# Patient Record
Sex: MALE | Race: WHITE | NOT HISPANIC OR LATINO | Employment: FULL TIME | URBAN - METROPOLITAN AREA
[De-identification: names, ages, dates, MRNs, and addresses within clinical notes are randomized per-mention and may not be internally consistent; named-entity substitution may affect disease eponyms.]

---

## 2019-01-02 ENCOUNTER — HOSPITAL ENCOUNTER (OUTPATIENT)
Dept: URGENT CARE | Facility: CLINIC | Age: 49
Discharge: HOME | End: 2019-01-02
Attending: NURSE PRACTITIONER

## 2019-02-11 ENCOUNTER — OFFICE VISIT CONVERTED (OUTPATIENT)
Dept: INTERNAL MEDICINE | Facility: CLINIC | Age: 49
End: 2019-02-11
Attending: NURSE PRACTITIONER

## 2021-02-04 ENCOUNTER — OFFICE VISIT CONVERTED (OUTPATIENT)
Dept: INTERNAL MEDICINE | Facility: CLINIC | Age: 51
End: 2021-02-04
Attending: STUDENT IN AN ORGANIZED HEALTH CARE EDUCATION/TRAINING PROGRAM

## 2021-04-02 ENCOUNTER — HOSPITAL ENCOUNTER (OUTPATIENT)
Dept: URGENT CARE | Facility: CLINIC | Age: 51
Discharge: HOME | End: 2021-04-02
Attending: EMERGENCY MEDICINE

## 2021-05-14 VITALS
HEIGHT: 69 IN | WEIGHT: 266.25 LBS | HEART RATE: 102 BPM | SYSTOLIC BLOOD PRESSURE: 130 MMHG | OXYGEN SATURATION: 95 % | BODY MASS INDEX: 39.44 KG/M2 | TEMPERATURE: 97.3 F | DIASTOLIC BLOOD PRESSURE: 80 MMHG

## 2021-05-14 NOTE — PROGRESS NOTES
Progress Note      Patient Name: Roberto Espino   Patient ID: [id number]   Sex: Male   YOB: 1970    Primary Care Provider: Kandice GOODSON    Visit Date: February 4, 2021    Provider: Pari Blandon MD   Location: [facility]   Location Address: [address] [place] [address], [address]  [place], [place]  [id number]   Location Phone: [phone]          Chief Complaint  · Possible bug bite      History Of Present Illness  Roberto Espino is a 51 year old /White male who presents for evaluation and treatment of:      Patient presenting for rash and insect bite over left arm.   first noted yesterday, now with erythema and hardened area, both of which are expanding.  Endorses warmth over area.   Denies any specific trauma or injury to the involved area.  Shares that there were 2 pinpoint lesion over the left elbow a few days ago, pruritic in nature, which he had been picking at, which self resolved.  Denies fevers or chills.   Denies anyone at home with similar rash.   Denies hx of cellulitis.   He has a hx of sensitive skin.  Shares that his son has had staph infections, however this was several years ago.       Past Medical History  Disease Name Date Onset Notes   Anxiety --  --    Depression --  --    Migraine headache --  --    Night sweats --  --    Reflux Disease --  --          Medication List  Name Date Started Instructions   cyclobenzaprine 10 mg oral tablet  take 1 tablet (10 mg) by oral route 2 times per day   divalproex 500 mg oral tablet extended release 24 hr  take 3 tablets (1,500 mg) by oral route once every evening at bedtime   hydroxyzine HCl 50 mg oral tablet  take 1 tablet by oral route 2 times a day   Nexium 40 mg oral capsule,delayed release(DR/EC)  take 1 capsule (40 mg) by oral route once daily   propranolol 80 mg oral capsule,extended release 24 hr  take 1 capsule (80 mg) by oral route once daily   Prozac 20 mg oral capsule  take 1 capsule (20 mg) by oral  "route 2 times per day in the morning and at noon   quetiapine 50 mg oral tablet  take 0.5 tablet by oral route in the morning and afternoon and then 2 tablets every evening before bed.   Requip 3 mg oral tablet  take 1 tablet (3 mg) by oral route 1-3 hours before bedtime   sumatriptan succinate 50 mg oral tablet  take 1 tablet (50 mg) by oral route after onset of migraine; may repeat after 2 hours if headache returns, not to exceed 200mg in 24hrs         Allergy List  Allergen Name Date Reaction Notes   NO KNOWN DRUG ALLERGIES --  --  --        Allergies Reconciled  Family Medical History  Disease Name Relative/Age Notes   Stroke  --    Diabetes  --          Social History  Finding Status Start/Stop Quantity Notes   Tobacco Never --/-- --  --          Review of Systems  · Constitutional  o Denies  o : fever, fatigue, weight loss, weight gain  · Cardiovascular  o Denies  o : lower extremity edema, claudication, chest pressure, palpitations  · Respiratory  o Denies  o : shortness of breath, wheezing, frequent cough, hemoptysis, dyspnea on exertion  · Gastrointestinal  o Denies  o : nausea, vomiting, diarrhea, constipation, abdominal pain  · Integument  o Admits  o : rash, itching, skin dryness      Vitals  Date Time BP Position Site L\R Cuff Size HR RR TEMP (F) WT  HT  BMI kg/m2 BSA m2 O2 Sat FR L/min FiO2 HC       02/11/2019 03:58 /82 Sitting    90 - R 16 98.2 255lbs 2oz 5'  9\" 37.67 2.37 96 %      02/04/2021 01:37 /80 Sitting    102 - R  97.3 266lbs 4oz 5'  9\" 39.32 2.42 95 %  21%          Physical Examination  · Constitutional  o Appearance  o : no acute distress, well-nourished  · Head and Face  o Head  o :   § Inspection  § : atraumatic, normocephalic  · Respiratory  o Respiratory Effort  o : breathing comfortably, symmetric chest rise  · Skin and Subcutaneous Tissue  o General Inspection  o : Indurated are over medial aspect of the Elbow measuring approximately 3 cm in diameter with darker center. " There is surrounding erythema extending 8 cm approximately towards the axilla and ~6 cm distally towards the forearm. Involved area is warm to the touch. Area is non-tender. 2 smaller pinpoint lesion noted over elbow, well healed.   · Neurologic  o Mental Status Examination  o :   § Orientation  § : grossly oriented to person, place and time  o Gait and Station  o :   § Gait Screening  § : normal gait              Assessment  · Cellulitis of left upper extremity     682.3/L03.114  Acute onset of left upper extremity cellulitis with exam findings as documented above. Keflex, 7-day course, sent in. Patient advised to call if symptoms fail to improve after 48 to 72 hours of this antibiotic. f/u in 1 week.     Problems Reconciled  Plan  · Orders  o ACO-39: Current medications updated and reviewed (1159F, ) - 682.3/L03.114 - 02/04/2021  · Medications  o cephalexin 500 mg oral capsule   SIG: take 1 capsule (500 mg) by oral route every 6 hours for 7 days   DISP: (28) Capsule with 0 refills  Prescribed on 02/04/2021     o Medications have been Reconciled  o Transition of Care or Provider Policy  · Instructions  o Take all medications as prescribed/directed.  o Patient was educated/instructed on their diagnosis, treatment and medications prior to discharge from the clinic today.  o Call the office with any concerns or questions.  · Disposition  o Follow up 1 week.            Electronically Signed by: Pari Blandon MD -Author on February 4, 2021 02:34:40 PM

## 2021-05-15 VITALS
BODY MASS INDEX: 37.79 KG/M2 | DIASTOLIC BLOOD PRESSURE: 82 MMHG | WEIGHT: 255.12 LBS | HEIGHT: 69 IN | SYSTOLIC BLOOD PRESSURE: 116 MMHG | TEMPERATURE: 98.2 F | RESPIRATION RATE: 16 BRPM | OXYGEN SATURATION: 96 % | HEART RATE: 90 BPM

## 2022-01-03 ENCOUNTER — LAB (OUTPATIENT)
Dept: LAB | Facility: HOSPITAL | Age: 52
End: 2022-01-03

## 2022-01-03 ENCOUNTER — TELEPHONE (OUTPATIENT)
Dept: INTERNAL MEDICINE | Facility: CLINIC | Age: 52
End: 2022-01-03

## 2022-01-03 DIAGNOSIS — Z20.822 EXPOSURE TO CONFIRMED CASE OF COVID-19: Primary | ICD-10-CM

## 2022-01-03 DIAGNOSIS — Z20.822 EXPOSURE TO CONFIRMED CASE OF COVID-19: ICD-10-CM

## 2022-01-03 PROCEDURE — U0004 COV-19 TEST NON-CDC HGH THRU: HCPCS

## 2022-01-03 NOTE — TELEPHONE ENCOUNTER
Red rule verified and correct.    Pt calling stating his son just tested positive and his work is wanting him tested.    Both were exposed at the same time.    Order placed for [place] [id number] through

## 2022-01-04 LAB — SARS-COV-2 RNA PNL SPEC NAA+PROBE: NOT DETECTED

## 2022-01-11 ENCOUNTER — TELEMEDICINE (OUTPATIENT)
Dept: FAMILY MEDICINE CLINIC | Facility: TELEHEALTH | Age: 52
End: 2022-01-11

## 2022-01-11 VITALS — WEIGHT: 266 LBS | BODY MASS INDEX: 39.4 KG/M2 | HEIGHT: 69 IN

## 2022-01-11 DIAGNOSIS — B86 SCABIES: Primary | ICD-10-CM

## 2022-01-11 PROBLEM — F32.A DEPRESSION: Status: ACTIVE | Noted: 2022-01-11

## 2022-01-11 PROBLEM — G43.909 MIGRAINE HEADACHE: Status: ACTIVE | Noted: 2022-01-11

## 2022-01-11 PROBLEM — R61 NIGHT SWEATS: Status: ACTIVE | Noted: 2022-01-11

## 2022-01-11 PROBLEM — F41.9 ANXIETY: Status: ACTIVE | Noted: 2022-01-11

## 2022-01-11 PROBLEM — K21.9 ESOPHAGEAL REFLUX: Status: ACTIVE | Noted: 2022-01-11

## 2022-01-11 PROCEDURE — 99203 OFFICE O/P NEW LOW 30 MIN: CPT | Performed by: NURSE PRACTITIONER

## 2022-01-11 RX ORDER — SUMATRIPTAN 50 MG/1
TABLET, FILM COATED ORAL
COMMUNITY

## 2022-01-11 RX ORDER — FLUOXETINE HYDROCHLORIDE 20 MG/1
CAPSULE ORAL
COMMUNITY

## 2022-01-11 RX ORDER — ROPINIROLE 3 MG/1
TABLET, FILM COATED ORAL
COMMUNITY

## 2022-01-11 RX ORDER — HYDROXYZINE 50 MG/1
TABLET, FILM COATED ORAL
COMMUNITY

## 2022-01-11 RX ORDER — PERMETHRIN 50 MG/G
1 CREAM TOPICAL ONCE
Qty: 1 G | Refills: 0 | Status: SHIPPED | OUTPATIENT
Start: 2022-01-11 | End: 2022-01-11

## 2022-01-11 NOTE — PROGRESS NOTES
"You have chosen to receive care through a telehealth visit.  Do you consent to use a video/audio connection for your medical care today? Yes     CHIEF COMPLAINT  Cc: rash    HPI  Roberto Espino is a 51 y.o. male  presents with complaint of a rash on his bilateral upper arms. It is itchy, red with some scabs. It is up and down both arms. He reports that he was treated once at his PCP office where he developed cellulitis from scratching some of the spots. He thought it might be scabies but no one in his household has the spots. He has also check his bed for bedbugs and there were none.     Review of Systems   Constitutional: Negative for fatigue and fever.   HENT: Negative for congestion and sore throat.    Respiratory: Negative for cough, chest tightness, shortness of breath and wheezing.    Cardiovascular: Negative for chest pain.   Gastrointestinal: Negative for diarrhea, nausea and vomiting.   Musculoskeletal: Negative for myalgias.   Skin: Positive for rash (bilateral arms, red, itchy spots with some scabs up and down both arms).   Neurological: Positive for headaches.       Past Medical History:   Diagnosis Date   • GERD (gastroesophageal reflux disease)        No family history on file.    Social History     Socioeconomic History   • Marital status:    Tobacco Use   • Smoking status: Never Smoker         Ht 175.3 cm (69\")   Wt 121 kg (266 lb)   BMI 39.28 kg/m²     PHYSICAL EXAM  Physical Exam   Constitutional: He is oriented to person, place, and time. He appears well-developed and well-nourished.   HENT:   Head: Normocephalic and atraumatic.   Right Ear: External ear normal.   Left Ear: External ear normal.   Nose: Nose normal.   Mouth/Throat: Mouth/Lips are normal.  Eyes: Lids are normal. Right eye exhibits no discharge and no exudate. Left eye exhibits no discharge and no exudate. Right conjunctiva is not injected. Left conjunctiva is not injected.   Pulmonary/Chest: No accessory muscle usage. No " tachypnea and no bradypnea.  No respiratory distress.No use of oxygen by nasal cannulaNo use of oxygen by mask noted.  Abdominal: Abdomen appears normal.   Neurological: He is alert and oriented to person, place, and time. No cranial nerve deficit.   Skin: Lesion: bilateral arms lesions in various stages, some erythemtous some crusted most in linear pattern.   Psychiatric: He has a normal mood and affect. His speech is normal and behavior is normal. Judgment and thought content normal.       Results for orders placed or performed in visit on 01/03/22   COVID-19,APTIMA PANTHER(KIKE),[facility], NP/OP SWAB IN UTM/VTM/[facility] MEDIA,24 HR TAT - Swab, Nasopharynx    Specimen: Nasopharynx; Swab   Result Value Ref Range    COVID19 Not Detected Not Detected - Ref. Range       Diagnoses and all orders for this visit:    1. Scabies (Primary)    Other orders  -     permethrin (ELIMITE) 5 % cream; Apply 1 application topically to the appropriate area as directed 1 (One) Time for 1 dose. May repeat in one week if need  Dispense: 1 g; Refill: 0    May take benadryl 25 mg every 4 to 6 hours as needed for itching   Alternative cetirizine 10 mg in the morning and benadryl at night  Try not to scratch  the affected areas of your skin  hot water to wash items. Dry items on the hot dry cycle  May spray furniture with Rid spray     FOLLOW-UP  If symptoms worsen or persist follow up with PCP,Virtual Care, dermatologist or Urgent Care    Patient verbalizes understanding of medication dosage, comfort measures, instructions for treatment and follow-up.    HAMZAH Eastman  01/11/2022  12:40 EST    This visit was performed via Telehealth.  This patient has been instructed to follow-up with their primary care provider if their symptoms worsen or the treatment provided does not resolve their illness.

## 2022-01-11 NOTE — PATIENT INSTRUCTIONS
Scabies, Adult    Scabies is a skin condition that happens when very small insects called mites get under the skin (infestation). This causes a rash and severe itchiness. Scabies is contagious, which means it can spread from person to person. If you get scabies, it is common for others in your household to get scabies too.  With proper treatment, symptoms usually go away in 2-4 weeks. Scabies usually does not cause lasting problems.  What are the causes?  This condition is caused by tiny mites (Sarcoptes scabiei, or human itch mites) that can only be seen with a microscope. The mites get into the top layer of skin and lay eggs. Scabies can spread from person to person through:  · Close contact with a person who has scabies.  · Sharing or having contact with infested items, such as towels, bedding, or clothing.  What increases the risk?  The following factors may make you more likely to develop this condition:  · Living in a nursing home or other extended care facility.  · Having sexual contact with a partner who has scabies.  · Caring for others who are at increased risk for scabies.  What are the signs or symptoms?  Symptoms of this condition include:  · Severe itchiness. This is often worse at night.  · A rash that includes tiny red bumps or blisters. The rash commonly occurs on the hands, wrists, elbows, armpits, chest, waist, groin, or buttocks. The bumps may form a line (burrow) in some areas.  · Skin irritation. This can include scaly patches or sores.  How is this diagnosed?  This condition may be diagnosed based on:  · A physical exam of the skin.  · A skin test. Your health care provider may take a sample of your affected skin (skin scraping) and have it examined under a microscope for signs of mites.  How is this treated?  This condition may be treated with:  · Medicated cream or lotion that kills the mites. This is spread on the entire body and left on for several hours. Usually, one treatment with  medicated cream or lotion is enough to kill all the mites. In severe cases, the treatment may need to be repeated.  · Medicated cream that relieves itching.  · Medicines taken by mouth (orally) that:  ? Relieve itching.  ? Reduce the swelling and redness.  ? Kill the mites. This treatment may be done in severe cases.  Follow these instructions at home:  Medicines  · Take or apply over-the-counter and prescription medicines only as told by your health care provider.  · Apply medicated cream or lotion as told by your health care provider.  · Do not wash off the medicated cream or lotion until the necessary amount of time has passed.  Skin care  · Avoid scratching the affected areas of your skin.  · Keep your fingernails closely trimmed to reduce injury from scratching.  · Take cool baths or apply cool washcloths to your skin to help reduce itching.  General instructions  · Clean all items that you had contact with during the 3 days before diagnosis. This includes bedding, clothing, towels, and furniture. Do this on the same day that you start treatment.  ? Dry-clean items, or use hot water to wash items. Dry items on the hot dry cycle.  ? Place items that cannot be washed into closed, airtight plastic bags for at least 3 days. The mites cannot live for more than 3 days away from human skin.  ? Vacuum furniture and mattresses that you use.  · Make sure that other people who may have been infested are examined by a health care provider. These include members of your household and anyone who may have had contact with infested items.  · Keep all follow-up visits. This is important.  Where to find more information  · Centers for Disease Control and Prevention: www.cdc.gov  Contact a health care provider if:  · You have itching that does not go away after 4 weeks of treatment.  · You continue to develop new bumps or burrows.  · You have redness, swelling, or pain in your rash area after treatment.  · You have fluid, blood,  or pus coming from your rash.  Summary  · Scabies is a skin condition that causes a rash and severe itchiness.  · This condition is caused by tiny mites that get into the top layer of the skin and lay eggs.  · Scabies can spread from person to person.  · Follow treatments as recommended by your health care provider.  · Clean all items that you recently had contact with.  This information is not intended to replace advice given to you by your health care provider. Make sure you discuss any questions you have with your health care provider.  Document Revised: 04/16/2021 Document Reviewed: 04/16/2021  Elsevier Patient Education © 2021 [employer]

## 2022-01-13 ENCOUNTER — TELEPHONE (OUTPATIENT)
Dept: INTERNAL MEDICINE | Facility: CLINIC | Age: 52
End: 2022-01-13

## 2022-05-03 ENCOUNTER — OFFICE VISIT (OUTPATIENT)
Dept: INTERNAL MEDICINE | Facility: CLINIC | Age: 52
End: 2022-05-03

## 2022-05-03 VITALS
HEIGHT: 69 IN | HEART RATE: 102 BPM | TEMPERATURE: 98.4 F | BODY MASS INDEX: 37.47 KG/M2 | SYSTOLIC BLOOD PRESSURE: 118 MMHG | DIASTOLIC BLOOD PRESSURE: 78 MMHG | WEIGHT: 253 LBS | OXYGEN SATURATION: 97 %

## 2022-05-03 DIAGNOSIS — J02.9 ACUTE PHARYNGITIS, UNSPECIFIED ETIOLOGY: Primary | ICD-10-CM

## 2022-05-03 DIAGNOSIS — H92.01 RIGHT EAR PAIN: ICD-10-CM

## 2022-05-03 LAB
EXPIRATION DATE: NORMAL
INTERNAL CONTROL: NORMAL
Lab: NORMAL
S PYO AG THROAT QL: NEGATIVE

## 2022-05-03 PROCEDURE — 87081 CULTURE SCREEN ONLY: CPT | Performed by: PHYSICIAN ASSISTANT

## 2022-05-03 PROCEDURE — 87880 STREP A ASSAY W/OPTIC: CPT | Performed by: PHYSICIAN ASSISTANT

## 2022-05-03 PROCEDURE — 99213 OFFICE O/P EST LOW 20 MIN: CPT | Performed by: PHYSICIAN ASSISTANT

## 2022-05-03 RX ORDER — CETIRIZINE HYDROCHLORIDE 10 MG/1
10 TABLET ORAL DAILY
Qty: 30 TABLET | Refills: 2 | Status: SHIPPED | OUTPATIENT
Start: 2022-05-03 | End: 2023-03-05

## 2022-05-03 RX ORDER — FLUTICASONE PROPIONATE 50 MCG
2 SPRAY, SUSPENSION (ML) NASAL DAILY
Qty: 11.1 ML | Refills: 2 | Status: SHIPPED | OUTPATIENT
Start: 2022-05-03

## 2022-05-03 NOTE — PROGRESS NOTES
"Chief Complaint  Sore Throat (Was treated 4/13 for positive strep)    Subjective          Roberto Espino presents to [facility]  Sore throat- was treated for strep 3 weeks ago.  He was put on amoxicillin and states that his symptoms resolved for about a week but a couple days ago he started having soreness under his tongue on the right side that radiates upward into his ear.        Objective   Vital Signs:   /78 (BP Location: Right arm, Patient Position: Sitting, Cuff Size: Large Adult)   Pulse 102   Temp 98.4 °F (36.9 °C) (Temporal)   Ht 175.3 cm (69\")   Wt 115 kg (253 lb)   SpO2 97%   BMI 37.36 kg/m²     Physical Exam  Vitals reviewed.   Constitutional:       Appearance: Normal appearance. He is well-developed.   HENT:      Head: Normocephalic and atraumatic.      Right Ear: Tympanic membrane, ear canal and external ear normal.      Left Ear: Tympanic membrane, ear canal and external ear normal.      Mouth/Throat:      Pharynx: No oropharyngeal exudate.   Eyes:      Conjunctiva/sclera: Conjunctivae normal.      Pupils: Pupils are equal, round, and reactive to light.   Cardiovascular:      Rate and Rhythm: Normal rate and regular rhythm.      Heart sounds: No murmur heard.    No friction rub. No gallop.   Pulmonary:      Effort: Pulmonary effort is normal.      Breath sounds: Normal breath sounds. No wheezing or rhonchi.   Abdominal:      General: Bowel sounds are normal. There is no distension.      Palpations: Abdomen is soft.      Tenderness: There is no abdominal tenderness.   Skin:     General: Skin is warm and dry.   Neurological:      Mental Status: He is alert and oriented to person, place, and time.      Cranial Nerves: No cranial nerve deficit.   Psychiatric:         Mood and Affect: Mood and affect normal.         Behavior: Behavior normal.         Thought Content: Thought content normal.         Judgment: Judgment normal.        Result " Review :          Procedures      Assessment and Plan    Diagnoses and all orders for this visit:    1. Acute pharyngitis, unspecified etiology (Primary)  Assessment & Plan:  Lab Results   Component Value Date    RAPSCRN Negative 05/03/2022     Will send strep swab for culture.  Will treat as postnasal drainage secondary to allergies with Flonase and Zyrtec.  Due to recurrence of throat and ear pain will have patient follow-up after trying allergy treatment.  If symptoms have not improved or resolved could consider further work-up including blood work and imaging such as possibly head and neck CT.  If patient develops new or worsening symptoms he is to return sooner.    Orders:  -     POCT rapid strep A  -     Beta Strep Culture, Throat - Swab, Throat    2. Right ear pain  Assessment & Plan:  Will send in Flonase and Zyrtec to use as needed.    Orders:  -     fluticasone (Flonase) 50 MCG/ACT nasal spray; 2 sprays into the nostril(s) as directed by provider Daily.  Dispense: 11.1 mL; Refill: 2  -     cetirizine (zyrTEC) 10 MG tablet; Take 1 tablet by mouth Daily for 30 days.  Dispense: 30 tablet; Refill: 2            Follow Up   No follow-ups on file.  Patient was given instructions and counseling regarding his condition or for health maintenance advice. Please see specific information pulled into the AVS if appropriate.

## 2022-05-03 NOTE — ASSESSMENT & PLAN NOTE
Lab Results   Component Value Date    RAPSCRN Negative 05/03/2022     Will send strep swab for culture.  Will treat as postnasal drainage secondary to allergies with Flonase and Zyrtec.  Due to recurrence of throat and ear pain will have patient follow-up after trying allergy treatment.  If symptoms have not improved or resolved could consider further work-up including blood work and imaging such as possibly head and neck CT.  If patient develops new or worsening symptoms he is to return sooner.

## 2022-05-05 LAB — BACTERIA SPEC AEROBE CULT: NORMAL

## 2022-05-10 ENCOUNTER — OFFICE VISIT (OUTPATIENT)
Dept: INTERNAL MEDICINE | Facility: CLINIC | Age: 52
End: 2022-05-10

## 2022-05-10 VITALS
SYSTOLIC BLOOD PRESSURE: 118 MMHG | HEIGHT: 69 IN | TEMPERATURE: 98 F | WEIGHT: 260 LBS | BODY MASS INDEX: 38.51 KG/M2 | OXYGEN SATURATION: 98 % | DIASTOLIC BLOOD PRESSURE: 65 MMHG | HEART RATE: 103 BPM

## 2022-05-10 DIAGNOSIS — J02.9 ACUTE PHARYNGITIS, UNSPECIFIED ETIOLOGY: Primary | ICD-10-CM

## 2022-05-10 PROCEDURE — 99213 OFFICE O/P EST LOW 20 MIN: CPT | Performed by: NURSE PRACTITIONER

## 2022-05-10 NOTE — ASSESSMENT & PLAN NOTE
Improving, however tonsil size impressive on exam today. Discussed options for management, including close monitoring versus ENT referral versus neck CT scan due to duration of symptoms. Since symptoms continue to improve patient would prefer close follow up in one month to reevaluate tonsil size and overall discomfort. He will call or return to clinic if pain becomes more severe, if he has difficulty swallowing or if he develops fever or shortness of breath. Low threshold for CT scan in this patient. Patient voices understanding and is agreeable to plan.

## 2022-05-10 NOTE — PROGRESS NOTES
"Chief Complaint  Follow-up and Sore Throat    Subjective          Roberto Espino presents to [facility]  Patient in clinic for one week follow up on sore throat. Initially treated with antibiotics for strep, however sore throat returned after being off of antibiotics x 1 week. Patient states pain started under his tongue and radiated up into his neck towards his ear. States the pain in his throat was so severe it hurt to swallow and he could not make the \"R\" sound. Patient was evaluated in clinic last week and started on Zyrtec and Flonase. Patient states he took the medications but did not feel like they were helping. States he did some research and felt like this was a lymph node so he started treating with warm compresses. Two days ago he went to bed with sore throat and had sweating that night. States he feels like he had a fever that broke. The next morning he woke up feeling better, the pain in the right side had gone down. He has noticed some tenderness on his left side but nothing as severe as his recent issues. Has been able to tolerate swallowing foods over the past two days.       Objective   Vital Signs:  /65   Pulse 103   Temp 98 °F (36.7 °C)   Ht 175.3 cm (69\")   Wt 118 kg (260 lb)   SpO2 98%   BMI 38.40 kg/m²           Physical Exam  Constitutional:       Appearance: Normal appearance.   HENT:      Head: Normocephalic and atraumatic.      Nose: Nose normal.      Mouth/Throat:      Mouth: Mucous membranes are moist.      Pharynx: Oropharynx is clear. Posterior oropharyngeal erythema present.      Comments: Tonsils 4+ and erythematous  Eyes:      Extraocular Movements: Extraocular movements intact.      Conjunctiva/sclera: Conjunctivae normal.      Pupils: Pupils are equal, round, and reactive to light.   Cardiovascular:      Rate and Rhythm: Normal rate and regular rhythm.      Heart sounds: Normal heart sounds.   Pulmonary:      Effort: " Pulmonary effort is normal.      Breath sounds: Normal breath sounds.   Skin:     General: Skin is warm and dry.   Neurological:      General: No focal deficit present.      Mental Status: He is alert and oriented to person, place, and time.   Psychiatric:         Mood and Affect: Mood normal.         Behavior: Behavior normal.         Thought Content: Thought content normal.        Result Review :                 Assessment and Plan    Diagnoses and all orders for this visit:    1. Acute pharyngitis, unspecified etiology (Primary)  Assessment & Plan:  Improving, however tonsil size impressive on exam today. Discussed options for management, including close monitoring versus ENT referral versus neck CT scan due to duration of symptoms. Since symptoms continue to improve patient would prefer close follow up in one month to reevaluate tonsil size and overall discomfort. He will call or return to clinic if pain becomes more severe, if he has difficulty swallowing or if he develops fever or shortness of breath. Low threshold for CT scan in this patient. Patient voices understanding and is agreeable to plan.              Follow Up   Return in about 1 month (around 6/10/2022).  Patient was given instructions and counseling regarding his condition or for health maintenance advice. Please see specific information pulled into the AVS if appropriate.

## 2022-10-11 ENCOUNTER — OFFICE VISIT (OUTPATIENT)
Dept: INTERNAL MEDICINE | Facility: CLINIC | Age: 52
End: 2022-10-11

## 2022-10-11 VITALS
SYSTOLIC BLOOD PRESSURE: 134 MMHG | HEIGHT: 69 IN | OXYGEN SATURATION: 97 % | DIASTOLIC BLOOD PRESSURE: 76 MMHG | TEMPERATURE: 98 F | BODY MASS INDEX: 40.32 KG/M2 | WEIGHT: 272.25 LBS | HEART RATE: 96 BPM

## 2022-10-11 DIAGNOSIS — J10.1 INFLUENZA A: Primary | ICD-10-CM

## 2022-10-11 DIAGNOSIS — J02.9 SORE THROAT: ICD-10-CM

## 2022-10-11 LAB
EXPIRATION DATE: ABNORMAL
EXPIRATION DATE: NORMAL
FLUAV AG UPPER RESP QL IA.RAPID: DETECTED
FLUBV AG UPPER RESP QL IA.RAPID: NOT DETECTED
INTERNAL CONTROL: ABNORMAL
INTERNAL CONTROL: NORMAL
Lab: ABNORMAL
Lab: NORMAL
S PYO AG THROAT QL: NEGATIVE
SARS-COV-2 AG UPPER RESP QL IA.RAPID: NOT DETECTED

## 2022-10-11 PROCEDURE — 99213 OFFICE O/P EST LOW 20 MIN: CPT | Performed by: NURSE PRACTITIONER

## 2022-10-11 PROCEDURE — 87428 SARSCOV & INF VIR A&B AG IA: CPT | Performed by: NURSE PRACTITIONER

## 2022-10-11 PROCEDURE — 87880 STREP A ASSAY W/OPTIC: CPT | Performed by: NURSE PRACTITIONER

## 2022-10-11 RX ORDER — DEXTROMETHORPHAN HYDROBROMIDE AND PROMETHAZINE HYDROCHLORIDE 15; 6.25 MG/5ML; MG/5ML
5 SYRUP ORAL 4 TIMES DAILY PRN
Qty: 118 ML | Refills: 0 | Status: SHIPPED | OUTPATIENT
Start: 2022-10-11

## 2022-10-11 RX ORDER — BENZONATATE 100 MG/1
100 CAPSULE ORAL 3 TIMES DAILY PRN
Qty: 30 CAPSULE | Refills: 0 | Status: SHIPPED | OUTPATIENT
Start: 2022-10-11

## 2022-10-11 NOTE — ASSESSMENT & PLAN NOTE
Exam reassuring, lung sounds clear, O2 sat 7%. Influenza positive, COVID-19 and rapid strep negative. Discussed course of viral illness.  Patient aware of importance of masking, avoiding exposure to other sick or high risk contacts, cough etiquette, hand hygiene, disinfecting surfaces, and avoiding touching eyes, nose and mouth. Continue supportive care. Increase fluids, monitor output. Tylenol/Motrin for fever/comfort.  Promethazine DM to pharmacy for nighttime, Tessalon Perles to be used during the day.  Salt water gargles, warm tea with honey, throat lozenges for sore throat management.  He will seek medical attention immediately with persistent fever, cough, shortness of breath. Patient to call or return to clinic if symptoms worsen or persist.  Work note provided.

## 2022-10-11 NOTE — PROGRESS NOTES
"Chief Complaint  Cough (Cough and abdominal pain from coughing hurts under left ribs, sore throat )    Subjective         Roberto Espino presents to [facility]  Patient reports cough, sore throat, congestion, runny nose, body aches, fatigue x 6 days.  Symptoms have improved slightly today.  Subjective fever initially. Denies shortness of breath, vomiting, diarrhea.  Eating/drinking well.  Plenty of urine output. He has been treating with Mucinex, Flonase, OTC cough.  States that the cough is so severe that it is keeping him up at night, is also having rib pain with cough.  Patient states his wife just tested positive for influenza today and his kids had it last week.  Denies known COVID-19 exposures.         Objective     Vitals:    10/11/22 1418   BP: 134/76   BP Location: Left arm   Patient Position: Sitting   Cuff Size: Adult   Pulse: 96   Temp: 98 °F (36.7 °C)   TempSrc: Infrared   SpO2: 97%   Weight: 123 kg (272 lb 4 oz)   Height: 175.3 cm (69\")      Body mass index is 40.2 kg/m².    Wt Readings from Last 3 Encounters:   10/11/22 123 kg (272 lb 4 oz)   06/24/22 113 kg (250 lb)   05/10/22 118 kg (260 lb)     BP Readings from Last 3 Encounters:   10/11/22 134/76   06/24/22 133/92   05/10/22 118/65                Physical Exam  Constitutional:       Appearance: Normal appearance.   HENT:      Head: Normocephalic and atraumatic.      Right Ear: Tympanic membrane normal.      Left Ear: Tympanic membrane normal.      Nose: Nose normal.      Mouth/Throat:      Mouth: Mucous membranes are moist.      Pharynx: Oropharynx is clear.   Eyes:      Extraocular Movements: Extraocular movements intact.      Conjunctiva/sclera: Conjunctivae normal.      Pupils: Pupils are equal, round, and reactive to light.   Cardiovascular:      Rate and Rhythm: Normal rate and regular rhythm.      Heart sounds: Normal heart sounds.   Pulmonary:      Effort: Pulmonary effort is normal.      " Breath sounds: Normal breath sounds.   Abdominal:      General: Bowel sounds are normal.      Palpations: Abdomen is soft.   Skin:     General: Skin is warm and dry.   Neurological:      General: No focal deficit present.      Mental Status: He is alert and oriented to person, place, and time.   Psychiatric:         Mood and Affect: Mood normal.         Behavior: Behavior normal.         Thought Content: Thought content normal.          Result Review :   The following data was reviewed by: HAMZAH Velazquez on 10/11/2022:      Procedures    Assessment and Plan   Diagnoses and all orders for this visit:    1. Influenza A (Primary)  Assessment & Plan:  Exam reassuring, lung sounds clear, O2 sat 7%. Influenza positive, COVID-19 and rapid strep negative. Discussed course of viral illness.  Patient aware of importance of masking, avoiding exposure to other sick or high risk contacts, cough etiquette, hand hygiene, disinfecting surfaces, and avoiding touching eyes, nose and mouth. Continue supportive care. Increase fluids, monitor output. Tylenol/Motrin for fever/comfort.  Promethazine DM to pharmacy for nighttime, Tessalon Perles to be used during the day.  Salt water gargles, warm tea with honey, throat lozenges for sore throat management.  He will seek medical attention immediately with persistent fever, cough, shortness of breath. Patient to call or return to clinic if symptoms worsen or persist.  Work note provided.        2. Sore throat  -     POCT SARS-CoV-2 Antigen AVELINO + Flu  -     POCT rapid strep A    Other orders  -     promethazine-dextromethorphan (PROMETHAZINE-DM) 6.25-15 MG/5ML syrup; Take 5 mL by mouth 4 (Four) Times a Day As Needed for Cough.  Dispense: 118 mL; Refill: 0  -     benzonatate (Tessalon Perles) 100 MG capsule; Take 1 capsule by mouth 3 (Three) Times a Day As Needed for Cough.  Dispense: 30 capsule; Refill: 0        Follow Up   Return if symptoms worsen or fail to improve.  Patient was given  instructions and counseling regarding his condition or for health maintenance advice. Please see specific information pulled into the AVS if appropriate.

## 2023-03-05 PROCEDURE — U0004 COV-19 TEST NON-CDC HGH THRU: HCPCS | Performed by: FAMILY MEDICINE

## 2023-10-16 ENCOUNTER — OFFICE VISIT (OUTPATIENT)
Dept: INTERNAL MEDICINE | Facility: CLINIC | Age: 53
End: 2023-10-16
Payer: OTHER GOVERNMENT

## 2023-10-16 VITALS
SYSTOLIC BLOOD PRESSURE: 128 MMHG | TEMPERATURE: 98.3 F | BODY MASS INDEX: 42.65 KG/M2 | DIASTOLIC BLOOD PRESSURE: 74 MMHG | HEIGHT: 69 IN | WEIGHT: 288 LBS | OXYGEN SATURATION: 95 % | HEART RATE: 96 BPM

## 2023-10-16 DIAGNOSIS — R05.9 COUGH, UNSPECIFIED TYPE: Primary | ICD-10-CM

## 2023-10-16 DIAGNOSIS — R50.9 FEVER, UNSPECIFIED FEVER CAUSE: ICD-10-CM

## 2023-10-16 LAB
EXPIRATION DATE: NORMAL
EXPIRATION DATE: NORMAL
FLUAV AG UPPER RESP QL IA.RAPID: NOT DETECTED
FLUBV AG UPPER RESP QL IA.RAPID: NOT DETECTED
INTERNAL CONTROL: NORMAL
INTERNAL CONTROL: NORMAL
Lab: 6887
Lab: 8208
S PYO AG THROAT QL: NEGATIVE
SARS-COV-2 AG UPPER RESP QL IA.RAPID: NOT DETECTED

## 2023-10-16 PROCEDURE — 99213 OFFICE O/P EST LOW 20 MIN: CPT | Performed by: PHYSICIAN ASSISTANT

## 2023-10-16 PROCEDURE — 87880 STREP A ASSAY W/OPTIC: CPT | Performed by: PHYSICIAN ASSISTANT

## 2023-10-16 PROCEDURE — 87428 SARSCOV & INF VIR A&B AG IA: CPT | Performed by: PHYSICIAN ASSISTANT

## 2023-10-16 RX ORDER — BROMPHENIRAMINE MALEATE, PSEUDOEPHEDRINE HYDROCHLORIDE, AND DEXTROMETHORPHAN HYDROBROMIDE 2; 30; 10 MG/5ML; MG/5ML; MG/5ML
10 SYRUP ORAL 4 TIMES DAILY PRN
Qty: 473 ML | Refills: 0 | Status: SHIPPED | OUTPATIENT
Start: 2023-10-16

## 2023-10-16 NOTE — ASSESSMENT & PLAN NOTE
Negative flu, covid and strep in office. Likely viral etiology.  Would expect symptoms to be self limiting within the next few days.  Continue conservative treatment at this time, will send in Bromfed.  Watch closely for new or worsening symptoms, especially if patient develops fevers, difficulty breathing or signs of dehydration.  Call or return if symptoms persist or worsen.

## 2023-10-16 NOTE — PROGRESS NOTES
"Chief Complaint  Fever (Symptoms started Saturday.), Chills, Headache, and Anorexia    Subjective          Roberto Espino presents to [facility]    Fever- symptoms started initially about 2 days ago with fever, nasal congestion, headache, loss of appetite.  He has been taking dayquil/nyquil and zyrtec.  No sick contacts that he is aware of.    Objective   Vital Signs:   /74   Pulse 96   Temp 98.3 °F (36.8 °C) (Temporal)   Ht 175.3 cm (69.02\")   Wt 131 kg (288 lb)   SpO2 95%   BMI 42.51 kg/m²     Physical Exam  Vitals reviewed.   Constitutional:       Appearance: Normal appearance. He is well-developed.   HENT:      Head: Normocephalic and atraumatic.      Right Ear: Tympanic membrane, ear canal and external ear normal.      Left Ear: Tympanic membrane, ear canal and external ear normal.      Mouth/Throat:      Mouth: Mucous membranes are moist.      Pharynx: Oropharynx is clear. Posterior oropharyngeal erythema present.   Eyes:      Conjunctiva/sclera: Conjunctivae normal.      Pupils: Pupils are equal, round, and reactive to light.   Cardiovascular:      Rate and Rhythm: Normal rate and regular rhythm.      Heart sounds: No murmur heard.     No friction rub. No gallop.   Pulmonary:      Effort: Pulmonary effort is normal.      Breath sounds: Normal breath sounds. No wheezing or rhonchi.   Skin:     General: Skin is warm and dry.   Neurological:      Mental Status: He is alert and oriented to person, place, and time.      Cranial Nerves: No cranial nerve deficit.   Psychiatric:         Mood and Affect: Mood and affect normal.         Behavior: Behavior normal.         Thought Content: Thought content normal.         Judgment: Judgment normal.        Result Review :          Procedures      Assessment and Plan    Diagnoses and all orders for this visit:    1. Cough, unspecified type (Primary)  Assessment & Plan:  Negative flu, covid and strep in office. " Likely viral etiology.  Would expect symptoms to be self limiting within the next few days.  Continue conservative treatment at this time, will send in Bromfed.  Watch closely for new or worsening symptoms, especially if patient develops fevers, difficulty breathing or signs of dehydration.  Call or return if symptoms persist or worsen.       Orders:  -     POCT rapid strep A  -     POCT SARS-CoV-2 Antigen AVELINO + Flu    2. Fever, unspecified fever cause  -     POCT rapid strep A  -     POCT SARS-CoV-2 Antigen AVELINO + Flu    Other orders  -     brompheniramine-pseudoephedrine-DM 30-2-10 MG/5ML syrup; Take 10 mL by mouth 4 (Four) Times a Day As Needed for Allergies.  Dispense: 473 mL; Refill: 0              Follow Up   No follow-ups on file.  Patient was given instructions and counseling regarding his condition or for health maintenance advice. Please see specific information pulled into the AVS if appropriate.

## 2023-11-30 ENCOUNTER — HOSPITAL ENCOUNTER (EMERGENCY)
Facility: HOSPITAL | Age: 53
Discharge: HOME | End: 2023-11-30
Attending: EMERGENCY MEDICINE
Payer: OTHER GOVERNMENT

## 2023-11-30 ENCOUNTER — APPOINTMENT (OUTPATIENT)
Dept: CT IMAGING | Facility: HOSPITAL | Age: 53
End: 2023-11-30
Payer: OTHER GOVERNMENT

## 2023-11-30 VITALS
TEMPERATURE: 97.8 F | WEIGHT: 270.5 LBS | DIASTOLIC BLOOD PRESSURE: 76 MMHG | OXYGEN SATURATION: 96 % | BODY MASS INDEX: 40.07 KG/M2 | HEIGHT: 69 IN | HEART RATE: 108 BPM | SYSTOLIC BLOOD PRESSURE: 118 MMHG | RESPIRATION RATE: 20 BRPM

## 2023-11-30 DIAGNOSIS — R19.7 DIARRHEA, UNSPECIFIED TYPE: ICD-10-CM

## 2023-11-30 DIAGNOSIS — R10.84 GENERALIZED ABDOMINAL PAIN: Primary | ICD-10-CM

## 2023-11-30 DIAGNOSIS — R74.8 ELEVATED LIVER ENZYMES: ICD-10-CM

## 2023-11-30 LAB
ALBUMIN SERPL-MCNC: 4.2 G/DL (ref 3.5–5.2)
ALBUMIN/GLOB SERPL: 1.2 G/DL
ALP SERPL-CCNC: 82 U/L (ref 39–117)
ALT SERPL W P-5'-P-CCNC: 165 U/L (ref 1–41)
ANION GAP SERPL CALCULATED.3IONS-SCNC: 15.3 MMOL/L (ref 5–15)
AST SERPL-CCNC: 124 U/L (ref 1–40)
BASOPHILS # BLD AUTO: 0.07 10*3/MM3 (ref 0–0.2)
BASOPHILS NFR BLD AUTO: 0.7 % (ref 0–1.5)
BILIRUB SERPL-MCNC: 0.6 MG/DL (ref 0–1.2)
BILIRUB UR QL STRIP: NEGATIVE
BUN SERPL-MCNC: 19 MG/DL (ref 6–20)
BUN/CREAT SERPL: 21.1 (ref 7–25)
CALCIUM SPEC-SCNC: 9 MG/DL (ref 8.6–10.5)
CHLORIDE SERPL-SCNC: 101 MMOL/L (ref 98–107)
CLARITY UR: CLEAR
CO2 SERPL-SCNC: 20.7 MMOL/L (ref 22–29)
COLOR UR: YELLOW
CREAT SERPL-MCNC: 0.9 MG/DL (ref 0.76–1.27)
D-LACTATE SERPL-SCNC: 0.9 MMOL/L (ref 0.5–2)
DEPRECATED RDW RBC AUTO: 40 FL (ref 37–54)
EGFRCR SERPLBLD CKD-EPI 2021: 102.8 ML/MIN/1.73
EOSINOPHIL # BLD AUTO: 0.31 10*3/MM3 (ref 0–0.4)
EOSINOPHIL NFR BLD AUTO: 3 % (ref 0.3–6.2)
ERYTHROCYTE [DISTWIDTH] IN BLOOD BY AUTOMATED COUNT: 12.2 % (ref 12.3–15.4)
GLOBULIN UR ELPH-MCNC: 3.4 GM/DL
GLUCOSE SERPL-MCNC: 114 MG/DL (ref 65–99)
GLUCOSE UR STRIP-MCNC: NEGATIVE MG/DL
HCT VFR BLD AUTO: 48.3 % (ref 37.5–51)
HGB BLD-MCNC: 16.3 G/DL (ref 13–17.7)
HGB UR QL STRIP.AUTO: NEGATIVE
HOLD SPECIMEN: NORMAL
HOLD SPECIMEN: NORMAL
IMM GRANULOCYTES # BLD AUTO: 0.03 10*3/MM3 (ref 0–0.05)
IMM GRANULOCYTES NFR BLD AUTO: 0.3 % (ref 0–0.5)
KETONES UR QL STRIP: ABNORMAL
LEUKOCYTE ESTERASE UR QL STRIP.AUTO: NEGATIVE
LIPASE SERPL-CCNC: 16 U/L (ref 13–60)
LYMPHOCYTES # BLD AUTO: 2.71 10*3/MM3 (ref 0.7–3.1)
LYMPHOCYTES NFR BLD AUTO: 26 % (ref 19.6–45.3)
MCH RBC QN AUTO: 30.4 PG (ref 26.6–33)
MCHC RBC AUTO-ENTMCNC: 33.7 G/DL (ref 31.5–35.7)
MCV RBC AUTO: 90.1 FL (ref 79–97)
MONOCYTES # BLD AUTO: 1.03 10*3/MM3 (ref 0.1–0.9)
MONOCYTES NFR BLD AUTO: 9.9 % (ref 5–12)
NEUTROPHILS NFR BLD AUTO: 6.27 10*3/MM3 (ref 1.7–7)
NEUTROPHILS NFR BLD AUTO: 60.1 % (ref 42.7–76)
NITRITE UR QL STRIP: NEGATIVE
NRBC BLD AUTO-RTO: 0 /100 WBC (ref 0–0.2)
PH UR STRIP.AUTO: <=5 [PH] (ref 5–8)
PLATELET # BLD AUTO: 299 10*3/MM3 (ref 140–450)
PMV BLD AUTO: 10.1 FL (ref 6–12)
POTASSIUM SERPL-SCNC: 4.1 MMOL/L (ref 3.5–5.2)
PROT SERPL-MCNC: 7.6 G/DL (ref 6–8.5)
PROT UR QL STRIP: NEGATIVE
RBC # BLD AUTO: 5.36 10*6/MM3 (ref 4.14–5.8)
SODIUM SERPL-SCNC: 137 MMOL/L (ref 136–145)
SP GR UR STRIP: >1.03 (ref 1–1.03)
UROBILINOGEN UR QL STRIP: ABNORMAL
WBC NRBC COR # BLD AUTO: 10.42 10*3/MM3 (ref 3.4–10.8)
WHOLE BLOOD HOLD COAG: NORMAL
WHOLE BLOOD HOLD SPECIMEN: NORMAL

## 2023-11-30 PROCEDURE — 96374 THER/PROPH/DIAG INJ IV PUSH: CPT

## 2023-11-30 PROCEDURE — 25010000002 ONDANSETRON PER 1 MG: Performed by: EMERGENCY MEDICINE

## 2023-11-30 PROCEDURE — 74177 CT ABD & PELVIS W/CONTRAST: CPT

## 2023-11-30 PROCEDURE — 25510000001 IOPAMIDOL PER 1 ML: Performed by: EMERGENCY MEDICINE

## 2023-11-30 PROCEDURE — 80053 COMPREHEN METABOLIC PANEL: CPT | Performed by: EMERGENCY MEDICINE

## 2023-11-30 PROCEDURE — 83605 ASSAY OF LACTIC ACID: CPT

## 2023-11-30 PROCEDURE — 81003 URINALYSIS AUTO W/O SCOPE: CPT | Performed by: EMERGENCY MEDICINE

## 2023-11-30 PROCEDURE — 25810000003 SODIUM CHLORIDE 0.9 % SOLUTION: Performed by: EMERGENCY MEDICINE

## 2023-11-30 PROCEDURE — 25010000002 MORPHINE PER 10 MG: Performed by: EMERGENCY MEDICINE

## 2023-11-30 PROCEDURE — 99285 EMERGENCY DEPT VISIT HI MDM: CPT

## 2023-11-30 PROCEDURE — 83690 ASSAY OF LIPASE: CPT | Performed by: EMERGENCY MEDICINE

## 2023-11-30 PROCEDURE — 36415 COLL VENOUS BLD VENIPUNCTURE: CPT

## 2023-11-30 PROCEDURE — 85025 COMPLETE CBC W/AUTO DIFF WBC: CPT | Performed by: EMERGENCY MEDICINE

## 2023-11-30 PROCEDURE — 96375 TX/PRO/DX INJ NEW DRUG ADDON: CPT

## 2023-11-30 RX ORDER — ONDANSETRON 2 MG/ML
4 INJECTION INTRAMUSCULAR; INTRAVENOUS ONCE
Status: COMPLETED | OUTPATIENT
Start: 2023-11-30 | End: 2023-11-30

## 2023-11-30 RX ORDER — SODIUM CHLORIDE 0.9 % (FLUSH) 0.9 %
10 SYRINGE (ML) INJECTION AS NEEDED
Status: DISCONTINUED | OUTPATIENT
Start: 2023-11-30 | End: 2023-11-30 | Stop reason: HOSPADM

## 2023-11-30 RX ORDER — LOPERAMIDE HYDROCHLORIDE 2 MG/1
4 CAPSULE ORAL ONCE
Status: COMPLETED | OUTPATIENT
Start: 2023-11-30 | End: 2023-11-30

## 2023-11-30 RX ORDER — DICYCLOMINE HCL 20 MG
40 TABLET ORAL EVERY 4 HOURS PRN
Qty: 30 TABLET | Refills: 0 | Status: SHIPPED | OUTPATIENT
Start: 2023-11-30

## 2023-11-30 RX ORDER — ONDANSETRON 4 MG/1
4 TABLET, ORALLY DISINTEGRATING ORAL 4 TIMES DAILY PRN
Qty: 15 TABLET | Refills: 0 | Status: SHIPPED | OUTPATIENT
Start: 2023-11-30

## 2023-11-30 RX ORDER — DICYCLOMINE HYDROCHLORIDE 10 MG/1
40 CAPSULE ORAL ONCE
Status: COMPLETED | OUTPATIENT
Start: 2023-11-30 | End: 2023-11-30

## 2023-11-30 RX ADMIN — IOPAMIDOL 100 ML: 755 INJECTION, SOLUTION INTRAVENOUS at 05:57

## 2023-11-30 RX ADMIN — DICYCLOMINE HYDROCHLORIDE 40 MG: 10 CAPSULE ORAL at 06:30

## 2023-11-30 RX ADMIN — SODIUM CHLORIDE 1000 ML: 9 INJECTION, SOLUTION INTRAVENOUS at 06:04

## 2023-11-30 RX ADMIN — ONDANSETRON 4 MG: 2 INJECTION INTRAMUSCULAR; INTRAVENOUS at 06:04

## 2023-11-30 RX ADMIN — LOPERAMIDE HYDROCHLORIDE 4 MG: 2 CAPSULE ORAL at 06:30

## 2023-11-30 RX ADMIN — MORPHINE SULFATE 4 MG: 4 INJECTION, SOLUTION INTRAMUSCULAR; INTRAVENOUS at 06:04

## 2023-11-30 NOTE — DISCHARGE INSTRUCTIONS
Rest, drink plenty of fluids.  Clear liquid diet for 24 hours then advance as tolerated slowly to a bland diet until your symptoms start improving.  Take your meds as prescribed.  You may take over-the-counter acetaminophen and Motrin as needed for aches pains and fever.  Call HAMZAH Velazquez today and follow-up with her as directed for further evaluation and treatment.  Return to the emergency department immediately for any acutely worsening and persistent abdominal pain, any persistent vomiting, any fevers of 101 or greater or any new or worse concerns.

## 2023-11-30 NOTE — ED PROVIDER NOTES
Subjective   History of Present Illness  The patient presents to the emergency department [facility] complaining of generalized abdominal pain with nausea and diarrhea.  He states that actually his symptoms started the week before Thanksgiving.  He states that he had a colonoscopy and then states that his son had a GI bug that lasted only for about a day or 2.  He states that he then started having nausea vomiting and diarrhea that started the Saturday after Thanksgiving he reports that he had a fever that day.  He states that he had nausea and diarrhea all week states his symptoms actually started getting better until about 2 days ago when the symptoms started getting worse.  He denies any blood or mucus in his stools but states they have been dark.  He states that the symptoms have been very bad for the last 2 days.  He does have tenderness throughout his abdomen with no rebound or guarding.  He denies any back pain.  He reports no fevers.  He states he is never had any inflammatory bowel disease or abdominal surgeries in the past.  He states he is having liquid stools and loose stools and passing gas.    History provided by:  Patient   used: No        Review of Systems   Constitutional:  Negative for chills and fever.   HENT:  Negative for congestion, ear pain and sore throat.    Eyes:  Negative for pain.   Respiratory:  Negative for cough, chest tightness, shortness of breath and wheezing.    Cardiovascular:  Negative for chest pain.   Gastrointestinal:  Positive for diarrhea, nausea and vomiting. Negative for abdominal pain, anal bleeding, blood in stool and constipation.   Genitourinary:  Negative for flank pain, frequency, hematuria and urgency.   Musculoskeletal:  Negative for back pain, joint swelling, neck pain and neck stiffness.   Skin:  Negative for pallor and rash.   Neurological:  Negative for seizures and headaches.   All other systems reviewed and are negative.      Past Medical  History:   Diagnosis Date    GERD (gastroesophageal reflux disease)     PTSD (post-traumatic stress disorder)     Restless legs syndrome (RLS)     Sleep apnea        No Known Allergies    Past Surgical History:   Procedure Laterality Date    CARDIAC SURGERY      HERNIA REPAIR      PERICARDIUM SURGERY         No family history on file.    Social History     Socioeconomic History    Marital status:    Tobacco Use    Smoking status: Never    Smokeless tobacco: Never   Vaping Use    Vaping Use: Never used   Substance and Sexual Activity    Alcohol use: Not Currently    Drug use: Never    Sexual activity: Defer           Objective   Physical Exam  Vitals and nursing note reviewed.   Constitutional:       General: He is not in acute distress.     Appearance: Normal appearance. He is well-developed. He is not ill-appearing or toxic-appearing.   HENT:      Head: Normocephalic and atraumatic.   Eyes:      General: No scleral icterus.  Cardiovascular:      Rate and Rhythm: Normal rate and regular rhythm.      Pulses: Normal pulses.   Pulmonary:      Effort: Pulmonary effort is normal. No respiratory distress.   Abdominal:      General: Abdomen is protuberant.      Palpations: Abdomen is soft.      Tenderness: There is generalized abdominal tenderness. There is no guarding or rebound.   Musculoskeletal:         General: Normal range of motion.      Cervical back: Normal range of motion and neck supple.   Skin:     General: Skin is warm and dry.      Capillary Refill: Capillary refill takes less than 2 seconds.      Findings: No rash.   Neurological:      General: No focal deficit present.      Mental Status: He is alert and oriented to person, place, and time. Mental status is at baseline.   Psychiatric:         Mood and Affect: Mood normal.         Behavior: Behavior normal.         Procedures           ED Course                                             Medical Decision Making  Problems Addressed:  Diarrhea,  unspecified type: complicated acute illness or injury  Elevated liver enzymes: complicated acute illness or injury  Generalized abdominal pain: complicated acute illness or injury    Amount and/or Complexity of Data Reviewed  Labs: ordered.  Radiology: ordered.    Risk  Prescription drug management.        Final diagnoses:   Generalized abdominal pain   Diarrhea, unspecified type   Elevated liver enzymes       ED Disposition  ED Disposition       ED Disposition   Discharge    Condition   Stable    Comment   --               Kandice Sandoval, APRN  [address] [id number] [phone]    Call today  FOR FOLLOW UP         Medication List        New Prescriptions      dicyclomine 20 MG tablet  Commonly known as: BENTYL  Take 2 tablets by mouth Every 4 (Four) Hours As Needed for Abdominal Cramping.     ondansetron ODT 4 MG disintegrating tablet  Commonly known as: ZOFRAN-ODT  Place 1 tablet on the tongue 4 (Four) Times a Day As Needed for Nausea or Vomiting.               Where to Get Your Medications        These medications were sent to [id number] - [place], [place] - [address] - [phone] [facility] [phone] FX  [address] [facility] [place] [address]      Phone: [phone]   dicyclomine 20 MG tablet  ondansetron ODT 4 MG disintegrating tablet            Amie Cristobal, APRN  11/30/23 [id number]

## 2023-11-30 NOTE — Clinical Note
[facility] EMERGENCY ROOM  [address]  [place] [address]  Phone: [phone]    Roberto Espino was seen and treated in our emergency department on 11/30/2023.  He may return to work on 12/04/2023.         Thank you for choosing [facility].    Amie Cristobal APRN

## 2025-07-07 ENCOUNTER — APPOINTMENT (OUTPATIENT)
Dept: GENERAL RADIOLOGY | Facility: HOSPITAL | Age: 55
End: 2025-07-07
Payer: OTHER GOVERNMENT

## 2025-07-07 ENCOUNTER — HOSPITAL ENCOUNTER (EMERGENCY)
Facility: HOSPITAL | Age: 55
Discharge: HOME | End: 2025-07-07
Attending: EMERGENCY MEDICINE | Admitting: EMERGENCY MEDICINE
Payer: OTHER GOVERNMENT

## 2025-07-07 VITALS
HEART RATE: 92 BPM | SYSTOLIC BLOOD PRESSURE: 112 MMHG | HEIGHT: 69 IN | BODY MASS INDEX: 40.16 KG/M2 | WEIGHT: 271.17 LBS | OXYGEN SATURATION: 98 % | DIASTOLIC BLOOD PRESSURE: 66 MMHG | TEMPERATURE: 98.8 F | RESPIRATION RATE: 18 BRPM

## 2025-07-07 DIAGNOSIS — M77.32 HEEL SPUR, LEFT: ICD-10-CM

## 2025-07-07 DIAGNOSIS — M72.2 PLANTAR FASCIITIS: ICD-10-CM

## 2025-07-07 DIAGNOSIS — M79.672 LEFT FOOT PAIN: Primary | ICD-10-CM

## 2025-07-07 PROCEDURE — 96372 THER/PROPH/DIAG INJ SC/IM: CPT

## 2025-07-07 PROCEDURE — 99283 EMERGENCY DEPT VISIT LOW MDM: CPT

## 2025-07-07 PROCEDURE — 25010000002 KETOROLAC TROMETHAMINE PER 15 MG: Performed by: NURSE PRACTITIONER

## 2025-07-07 PROCEDURE — 73630 X-RAY EXAM OF FOOT: CPT

## 2025-07-07 PROCEDURE — 25010000002 DEXAMETHASONE SODIUM PHOSPHATE 10 MG/ML SOLUTION: Performed by: NURSE PRACTITIONER

## 2025-07-07 RX ORDER — KETOROLAC TROMETHAMINE 30 MG/ML
60 INJECTION, SOLUTION INTRAMUSCULAR; INTRAVENOUS ONCE
Status: COMPLETED | OUTPATIENT
Start: 2025-07-07 | End: 2025-07-07

## 2025-07-07 RX ORDER — DEXAMETHASONE 4 MG/1
4 TABLET ORAL 2 TIMES DAILY WITH MEALS
Qty: 14 TABLET | Refills: 0 | Status: SHIPPED | OUTPATIENT
Start: 2025-07-07 | End: 2025-07-14

## 2025-07-07 RX ORDER — DEXAMETHASONE SODIUM PHOSPHATE 10 MG/ML
10 INJECTION, SOLUTION INTRAMUSCULAR; INTRAVENOUS ONCE
Status: COMPLETED | OUTPATIENT
Start: 2025-07-07 | End: 2025-07-07

## 2025-07-07 RX ORDER — KETOROLAC TROMETHAMINE 10 MG/1
10 TABLET, FILM COATED ORAL EVERY 6 HOURS PRN
Qty: 20 TABLET | Refills: 0 | Status: SHIPPED | OUTPATIENT
Start: 2025-07-07

## 2025-07-07 RX ADMIN — KETOROLAC TROMETHAMINE 60 MG: 30 INJECTION, SOLUTION INTRAMUSCULAR at 19:40

## 2025-07-07 RX ADMIN — DEXAMETHASONE SODIUM PHOSPHATE 10 MG: 10 INJECTION INTRAMUSCULAR; INTRAVENOUS at 19:42

## 2025-07-07 NOTE — ED PROVIDER NOTES
Time: 7:00 PM EDT  Date of encounter:  7/7/2025  Independent Historian/Clinical History and Information was obtained by:   Patient    History is limited by: N/A    Chief Complaint: Foot injury      History of Present Illness:  Patient is a 55 y.o. year old male who presents to the emergency department for evaluation of left foot pain and injury since 5:00.  Patient was caught in the rain and went to run and ended up feeling a popping sensation in the bottom of his foot with severe pain that has not gone away and feels like it is progressively worsening.  Patient has a history of chronic severe plantar fasciitis and normally gets injections from his foot specialist and the bottom of his foot the last 1 was about 6 months ago.  No numbness tingling or weakness.  No bruising or swelling.  Pain is just on his foot at the start of his heel and the arch way pain with ambulation and touch      Patient Care Team  Primary Care Provider: Kandice Sandoval APRN    Past Medical History:     No Known Allergies  Past Medical History:   Diagnosis Date    GERD (gastroesophageal reflux disease)     PTSD (post-traumatic stress disorder)     Restless legs syndrome (RLS)     Sleep apnea      Past Surgical History:   Procedure Laterality Date    CARDIAC SURGERY      HERNIA REPAIR      PERICARDIUM SURGERY       History reviewed. No pertinent family history.    Home Medications:  Prior to Admission medications    Medication Sig Start Date End Date Taking? Authorizing Provider   azelastine (ASTELIN) 0.1 % nasal spray 2 sprays into the nostril(s) as directed by provider 2 (Two) Times a Day. Use in each nostril as directed 6/24/22   Soto Guillermo PA-C   benzonatate (Tessalon Perles) 100 MG capsule Take 1 capsule by mouth 3 (Three) Times a Day As Needed for Cough. 10/11/22   Kandice Sandoval APRN   brompheniramine-pseudoephedrine-DM 30-2-10 MG/5ML syrup Take 10 mL by mouth 4 (Four) Times a Day As Needed for Allergies. 10/16/23   Anisa  Mervat ABEBE PA-C   cetirizine (zyrTEC) 10 MG tablet Take 1 tablet by mouth Daily for 30 days. 5/3/22 3/5/23  Mevrat Lange PA-C   dicyclomine (BENTYL) 20 MG tablet Take 2 tablets by mouth Every 4 (Four) Hours As Needed for Abdominal Cramping. 11/30/23   Amie Cristobal APRN   divalproex (DEPAKOTE ER) 500 MG 24 hr tablet divalproex 500 mg oral tablet extended release 24 hr take 3 tablets (1,500 mg) by oral route once every evening at bedtime   Active    Emergency, Nurse Epic, RN   esomeprazole (nexIUM) 40 MG capsule Nexium 40 mg oral capsule,delayed release(DR/EC) take 1 capsule (40 mg) by oral route once daily   Active    Emergency, Nurse Gm, RN   FLUoxetine (PROzac) 20 MG capsule Prozac 20 mg oral capsule take 1 capsule (20 mg) by oral route 2 times per day in the morning and at noon   Active    Provider, MD Zahira   fluticasone (Flonase) 50 MCG/ACT nasal spray 2 sprays into the nostril(s) as directed by provider Daily. 5/3/22   Mervat Lange PA-C   hydrOXYzine (ATARAX) 50 MG tablet hydroxyzine HCl 50 mg oral tablet take 1 tablet by oral route 2 times a day   Active    Provider, MD Zahira   ondansetron ODT (ZOFRAN-ODT) 4 MG disintegrating tablet Place 1 tablet on the tongue 4 (Four) Times a Day As Needed for Nausea or Vomiting. 11/30/23   Amie Cristobal APRN   promethazine-dextromethorphan (PROMETHAZINE-DM) 6.25-15 MG/5ML syrup Take 5 mL by mouth 4 (Four) Times a Day As Needed for Cough. 10/11/22   Kandice Sandoval APRN   promethazine-dextromethorphan (PROMETHAZINE-DM) 6.25-15 MG/5ML syrup Take 1 teaspoon each 4 to 6 hours as needed for cough.  Be aware that the medication can make you drowsy. 3/5/23   Claudia Braswell MD   propranolol XL (INNOPRAN XL) 80 MG 24 hr capsule propranolol 80 mg oral capsule,extended release 24 hr take 1 capsule (80 mg) by oral route once daily   Active    Provider, MD Zahira   QUEtiapine (SEROquel) 50 MG tablet quetiapine 50 mg oral tablet take 0.5 tablet by oral  "route in the morning and afternoon and then 2 tablets every evening before bed.   Active    Emergency, Nurse Epic, RN   rOPINIRole (REQUIP) 3 MG tablet Requip 3 mg oral tablet take 1 tablet (3 mg) by oral route 1-3 hours before bedtime   Active    Provider, MD Zahira   SUMAtriptan (IMITREX) 50 MG tablet sumatriptan succinate 50 mg oral tablet take 1 tablet (50 mg) by oral route after onset of migraine; may repeat after 2 hours if headache returns, not to exceed 200mg in 24hrs   Active    Provider, MD Zahira        Social History:   Social History     Tobacco Use    Smoking status: Never    Smokeless tobacco: Never   Vaping Use    Vaping status: Never Used   Substance Use Topics    Alcohol use: Not Currently    Drug use: Never         Review of Systems:  Review of Systems   Musculoskeletal:  Positive for arthralgias and gait problem (Painful to bear weight).   Skin:  Negative for color change.   Neurological:  Negative for weakness and numbness.   Psychiatric/Behavioral: Negative.     All other systems reviewed and are negative.       Physical Exam:  /66 (Patient Position: Lying)   Pulse 92   Temp 98.8 °F (37.1 °C) (Oral)   Resp 18   Ht 175.3 cm (69\")   Wt 123 kg (271 lb 2.7 oz)   SpO2 98%   BMI 40.04 kg/m²     Physical Exam  Vitals and nursing note reviewed.   HENT:      Head: Atraumatic.   Cardiovascular:      Pulses: Normal pulses.   Pulmonary:      Effort: Pulmonary effort is normal.   Musculoskeletal:         General: Tenderness (Tenderness to the plantar aspect of the foot at the proximal start of the Archway) present. No swelling. Normal range of motion.      Cervical back: Normal range of motion.   Skin:     General: Skin is warm and dry.      Capillary Refill: Capillary refill takes less than 2 seconds.      Findings: No bruising, erythema or rash.   Neurological:      General: No focal deficit present.      Mental Status: He is alert.   Psychiatric:         Mood and Affect: Mood " normal.         Behavior: Behavior normal.                Medical Decision Making:      Comorbidities that affect care:    GERD restless leg syndrome, sleep apnea, PTSD    External Notes reviewed:    Previous ED Note: Patient last seen in the emergency department back in November 2023 for abdominal pain and elevated liver enzymes      The following orders were placed and all results were independently analyzed by me:  Orders Placed This Encounter   Procedures    [facility] 13. Crutches (); Left sided injury    XR Foot 3+ View Left       Medications Given in the Emergency Department:  Medications   ketorolac (TORADOL) injection 60 mg (60 mg Intramuscular Given 7/7/25 1940)   dexAMETHasone sodium phosphate injection 10 mg (10 mg Intramuscular Given 7/7/25 1942)        ED Course:    ED Course as of 07/07/25 2006 Mon Jul 07, 2025 2001 XR Foot 3+ View Left  No evidence of fracture [DS]      ED Course User Index  [DS] Christiana Tucker APRN       Labs:    Lab Results (last 24 hours)       ** No results found for the last 24 hours. **             Imaging:    XR Foot 3+ View Left  Result Date: 7/7/2025  XR FOOT 3+ VW LEFT Date of Exam: 7/7/2025 7:18 PM EDT Indication: pain Comparison: None available. Findings: There is no evidence of acute fracture. Bipartite medial hallux sesamoid. Normal joint alignment. Calcaneal enthesopathy. Soft tissues are unremarkable.     Impression: 1.No evidence of acute fracture. Electronically Signed: Rashad Rosado MD  7/7/2025 7:57 PM EDT  Workstation ID: [id number]        Differential Diagnosis and Discussion:    Extremity Pain: Differential diagnosis includes but is not limited to soft tissue sprain, tendonitis, tendon injury, dislocation, fracture, deep vein thrombosis, arterial insufficiency, osteoarthritis, bursitis, and ligamentous damage.    PROCEDURES:    X-ray were performed in the emergency department and all X-ray impressions were independently interpreted by me.    No  orders to display       Procedures    MDM  Number of Diagnoses or Management Options  Heel spur, left  Left foot pain  Plantar fasciitis  Diagnosis management comments: The patient's symptoms are consistent with a strain vs. sprain.      A muscle strain, also known as a pulled muscle, is an injury that occurs when a muscle is overstretched or torn, often as a result of fatigue, overuse, or improper use. This can result in pain, swelling, and a limited range of motion.     A sprain, on the other hand, is an injury to a ligament, which is the tissue that connects bones to each other. Sprains often occur in joints like the ankle or wrist when they are twisted or impacted in a way that stretches or tears the ligaments. Symptoms of a sprain can include pain, swelling, bruising, and a decreased ability to move the joint.     The patient was counseled to use rest, ice, and elevation and follow-up with their PCP or an orthopedic surgeon.  Patient was given crutches to allow him to not weight-bear until he sees his doctor       Amount and/or Complexity of Data Reviewed  Tests in the radiology section of CPT®: reviewed and ordered  Tests in the medicine section of CPT®: ordered and reviewed    Risk of Complications, Morbidity, and/or Mortality  Presenting problems: low  Diagnostic procedures: low  Management options: low    Patient Progress  Patient progress: stable           Patient Care Considerations:    CONSULT: I considered consulting orthopedic surgery, however no acute bony abnormalities to warrant immediate intervention and patient has a foot and ankle doctor that he can follow-up with tomorrow that he already has established care with      Consultants/Shared Management Plan:    SHARED VISIT: I have discussed the case with my supervising physician, Dr. Lua who states okay to discharge home with symptomatic relief. The substantive portion of the medical decision was made by the attesting physician who made or  approve the management plan and will take responsibility for the patient.  Clinical findings were discussed and ultimate disposition was made in consult with supervising physician.    Social Determinants of Health:    Patient is independent, reliable, and has access to care.       Disposition and Care Coordination:    Discharged: The patient is suitable and stable for discharge with no need for consideration of admission.    I have explained the patient´s condition, diagnoses and treatment plan based on the information available to me at this time. I have answered questions and addressed any concerns. The patient has a good  understanding of the patient´s diagnosis, condition, and treatment plan as can be expected at this point. The vital signs have been stable. The patient´s condition is stable and appropriate for discharge from the emergency department.      The patient will pursue further outpatient evaluation with the primary care physician or other designated or consulting physician as outlined in the discharge instructions. They are agreeable to this plan of care and follow-up instructions have been explained in detail. The patient has received these instructions in written format and has expressed an understanding of the discharge instructions. The patient is aware that any significant change in condition or worsening of symptoms should prompt an immediate return to this or the closest emergency department or call to 911.  I have explained discharge medications and the need for follow up with the patient/caretakers. This was also printed in the discharge instructions. Patient was discharged with the following medications and follow up:      Medication List        New Prescriptions      dexAMETHasone 4 MG tablet  Commonly known as: DECADRON  Take 1 tablet by mouth 2 (Two) Times a Day With Meals for 7 days.     ketorolac 10 MG tablet  Commonly known as: TORADOL  Take 1 tablet by mouth Every 6 (Six) Hours As  Needed for Mild Pain, Moderate Pain or Severe Pain.               Where to Get Your Medications        These medications were sent to [facility] - [place], [place] - [address] [facility] - [phone]  - [phone] FX  [address] [place] [address]      Phone: [phone]   dexAMETHasone 4 MG tablet  ketorolac 10 MG tablet      Your foot and ankle doctor    Call in 1 day         Final diagnoses:   Left foot pain   Plantar fasciitis   Heel spur, left        ED Disposition       ED Disposition   Discharge    Condition   Stable    Comment   --               This medical record created using voice recognition software.             Christiana Tucker, APRN  07/07/25 2006

## 2025-07-07 NOTE — Clinical Note
[facility] EMERGENCY ROOM  [address] [place] [address]  Phone: [phone]  Fax: [phone]    Roberto Espino was seen and treated in our emergency department on 7/7/2025.  He may return to work on 07/09/2025.         Thank you for choosing [facility].    Christiana Tucker APRN

## 2025-07-08 NOTE — ED PROVIDER NOTES
"SHARED VISIT ATTESTATION:    This visit was performed by myself and an APC.  I personally approved the management plan/medical decision making and take responsibility for the patient management.      SHARED VISIT NOTE:    Patient is 55 y.o. year old male that presents to the ED for evaluation of foot pain is gotten worse..     Physical Exam    ED Course:    /66 (Patient Position: Lying)   Pulse 92   Temp 98.8 °F (37.1 °C) (Oral)   Resp 18   Ht 175.3 cm (69\")   Wt 123 kg (271 lb 2.7 oz)   SpO2 98%   BMI 40.04 kg/m²       The following orders were placed and all results were independently analyzed by me:  Orders Placed This Encounter   Procedures    [facility] 13. Crutches (); Left sided injury    XR Foot 3+ View Left       Medications Given in the Emergency Department:  Medications   ketorolac (TORADOL) injection 60 mg (60 mg Intramuscular Given 7/7/25 1940)   dexAMETHasone sodium phosphate injection 10 mg (10 mg Intramuscular Given 7/7/25 1942)        ED Course:    ED Course as of 07/07/25 2008 Mon Jul 07, 2025 2001 XR Foot 3+ View Left  No evidence of fracture [DS]      ED Course User Index  [DS] Christiana Tucker, HAMZAH       Labs:    Lab Results (last 24 hours)       ** No results found for the last 24 hours. **             Imaging:    XR Foot 3+ View Left  Result Date: 7/7/2025  XR FOOT 3+ VW LEFT Date of Exam: 7/7/2025 7:18 PM EDT Indication: pain Comparison: None available. Findings: There is no evidence of acute fracture. Bipartite medial hallux sesamoid. Normal joint alignment. Calcaneal enthesopathy. Soft tissues are unremarkable.     Impression: 1.No evidence of acute fracture. Electronically Signed: Rashad Rosado MD  7/7/2025 7:57 PM EDT  Workstation ID: [id number]      MDM:    Procedures    X-ray were performed in the emergency department and all X-ray impressions were independently interpreted by me.                     Rosales Luo MD  20:08 EDT  07/07/25         Valerio, " MD Rosales  07/07/25 2008

## 2025-07-08 NOTE — DISCHARGE INSTRUCTIONS
X-ray was negative did not show any acute bony abnormality to indicate any type of disruption or detachment of any structures.    Rest.  Ice.  Use crutches to not weight-bear until improved.  Medications as prescribed.    Follow-up with your foot and ankle doctor tomorrow to discuss additional treatment